# Patient Record
(demographics unavailable — no encounter records)

---

## 2025-04-22 NOTE — ASSESSMENT
[FreeTextEntry1] : Bimalleolar fracture left ankle  The history for today's visit was obtained from the child, as well as the parent. The child's history was unreliable alone due to age and therefore, the parent was used today as an independent historian.  3 views of the left ankle today in the cast reveal a medial malleolar fracture and Salter I fibula through scar of physis. No change from ER films. Acceptable position. Mild gap less than 2mm but no step off noted. Mortise intact. The xrays were discussed with father and patient at length. The fracture is acceptable in alignment at this time, but continued monitoring is needed to ensure no change in position. If this occurs, surgery may be indicated. He will continue NWB with the use of the crutches at this time. He will f/u in 1 week at the [facility] office for repeat xrays of the left ankle in the cast. No gym or sports. May attend school with accommodations. Moving forward, he may be able to f/u in the [place] office as they live in [place], but father works in [place]. All questions answered. Parent in agreement with the plan.  I, Alisha Navarro, MPAS, PAC, have acted as a scribe and documented the above for Dr. Renteria.   The above documentation completed by the PA is an accurate record of both my words and actions. Norm Renteria MD.  This note was generated using Dragon medical dictation software.  A reasonable effort has been made for proofreading its contents, but typos may still remain.  If there are any questions or points of clarification needed please do not hesitate to contact my office.

## 2025-04-22 NOTE — PHYSICAL EXAM
[FreeTextEntry1] : GAIT: ambulates with crutches, NWB on affected extremity with good coordination and balance. Shows competency with crutching. GENERAL: alert, cooperative pleasant young 12 yo male in NAD SKIN: The skin is intact, warm, pink and dry over the area examined. EYES: Normal conjunctiva, normal eyelids and pupils were equal and round. ENT: normal ears, normal nose and normal lips. CARDIOVASCULAR: brisk capillary refill, but no peripheral edema. RESPIRATORY: The patient is in no apparent respiratory distress. They're taking full deep breaths without use of accessory muscles or evidence of audible wheezes or stridor without the use of a stethoscope. Normal respiratory effort. ABDOMEN: not examined   LLE: Cast well fitting. SLC skin intact to areas exposed Toes mobile, 5/5 EHL/FHL sensation grossly intact brisk cap refill No pain with passive stretch of the toes

## 2025-04-22 NOTE — HISTORY OF PRESENT ILLNESS
[0] : currently ~his/her~ pain is 0 out of 10 [FreeTextEntry1] : 14 yo male presents with father for evaluation of left ankle fracture. He states it occurred last week while playing football. He went to [facility] and was found to have medial malleolar fracture and distal fibular fracture. He is currently in a SLC. He has been using crutches and admits to NWB on the LLE. No complaints of pain. No cast issues. No numbness or tingling.

## 2025-04-22 NOTE — REVIEW OF SYSTEMS
[Change in Activity] : change in activity [Limping] : limping [Joint Pains] : arthralgias [Joint Swelling] : joint swelling  [Appropriate Age Development] : development appropriate for age [Rash] : no rash [Heart Problems] : no heart problems [Congestion] : no congestion [Feeding Problem] : no feeding problem

## 2025-04-22 NOTE — HISTORY OF PRESENT ILLNESS
[0] : currently ~his/her~ pain is 0 out of 10 [FreeTextEntry1] : 14 yo male presents with father for evaluation of left ankle fracture. He states it occurred last week while playing football. He went to [facility] and was found to have medial malleolar fracture and distal fibular fracture. He is currently in a SLC. He has been using crutches and admits to NWB on the LLE. No complaints of pain. No cast issues. No numbness or tingling.

## 2025-04-22 NOTE — DATA REVIEWED
[de-identified] : 3 views of the left ankle today in the cast reveal a medial malleolar fracture and Salter I fibula through scar of physis. No change from ER films. Acceptable position. Mild gap less than 2mm but no step off noted. Mortise intact.

## 2025-04-22 NOTE — DATA REVIEWED
[de-identified] : 3 views of the left ankle today in the cast reveal a medial malleolar fracture and Salter I fibula through scar of physis. No change from ER films. Acceptable position. Mild gap less than 2mm but no step off noted. Mortise intact.

## 2025-04-22 NOTE — REASON FOR VISIT
[Initial Evaluation] : an initial evaluation [Mother] : mother [Patient] : patient [Father] : father [FreeTextEntry1] : Left ankle injury

## 2025-04-22 NOTE — ASSESSMENT
[FreeTextEntry1] : Bimalleolar fracture left ankle  The history for today's visit was obtained from the child, as well as the parent. The child's history was unreliable alone due to age and therefore, the parent was used today as an independent historian.  3 views of the left ankle today in the cast reveal a medial malleolar fracture and Salter I fibula through scar of physis. No change from ER films. Acceptable position. Mild gap less than 2mm but no step off noted. Mortise intact. The xrays were discussed with father and patient at length. The fracture is acceptable in alignment at this time, but continued monitoring is needed to ensure no change in position. If this occurs, surgery may be indicated. He will continue NWB with the use of the crutches at this time. He will f/u in 1 week at the [facility] office for repeat xrays of the left ankle in the cast. No gym or sports. May attend school with accommodations. Moving forward, he may be able to f/u in the [place] office as they live in [place], but father works in [place]. All questions answered. Parent in agreement with the plan.  I, Alisha Navarro, MPAS, PAC, have acted as a scribe and documented the above for Dr. Renteria.   The above documentation completed by the PA is an accurate record of both my words and actions. Norm Renteria MD.  This note was generated using Dragon medical dictation software.  A reasonable effort has been made for proofreading its contents, but typos may still remain.  If there are any questions or points of clarification needed please do not hesitate to contact my office.

## 2025-05-01 NOTE — HISTORY OF PRESENT ILLNESS
[0] : currently ~his/her~ pain is 0 out of 10 [FreeTextEntry1] : 13-year-old male presents with father for evaluation of left ankle fracture sustained on 04/15/2025. Per report it occurred while playing football. He went to [facility] and was found to have medial malleolar fracture and distal fibular fracture. He was placed in a SLC.   He was initially seen in our office on 04/21/2025 and recommended for continuation of SLC. Today father reports that he is tolerating the cast well. Denies any discomfort or pain. Denies any radiating pain or tingling sensation. He is not taking any pain medication. He has been non weight bearing on left lower extremity and using crutches for ambulation. Here for further orthopedic evaluation.

## 2025-05-01 NOTE — ASSESSMENT
[FreeTextEntry1] : 13-year-old male with left bimalleolar fracture sustained on 04/15/2025.  The history for today's visit was obtained from the child, as well as the parent. The child's history was unreliable alone due to age and therefore, the parent was used today as an independent historian. 3 views left ankle IN CAST radiographs were ordered, obtained, and independently reviewed in clinic on 04/30/2025 depicting medial malleolar fracture and Salter I fibula fracture with acceptable alignment compared to previous images. The fracture is acceptable in alignment at this time, but continued monitoring is needed to ensure no change in position. If this occurs, surgery may be indicated. He will continue NWB SLC at this time and crutches for ambulation.  No gym or sports. School note was provided. May attend school with accommodations.   We will plan to see him back in 3 weeks for cast removal and repeat X-Rays OOC and reevaluation.  All questions answered. Parent in agreement with the plan.  I, Sangeeta Rabago, have acted as a scribe and documented the above for Dr. Renteria.   The above documentation completed by the scribe is an accurate record of both my words and actions. Norm Renteria MD.

## 2025-05-01 NOTE — PHYSICAL EXAM
[FreeTextEntry1] : GAIT: ambulates with crutches, NWB on left lower extremity. GENERAL: alert, cooperative pleasant young 13 year old male in NAD SKIN: The skin is intact, warm, pink and dry over the area examined. EYES: Normal conjunctiva, normal eyelids and pupils were equal and round. ENT: normal ears, normal nose and normal lips. CARDIOVASCULAR: brisk capillary refill, but no peripheral edema. RESPIRATORY: The patient is in no apparent respiratory distress. They're taking full deep breaths without use of accessory muscles or evidence of audible wheezes or stridor without the use of a stethoscope. Normal respiratory effort. ABDOMEN: not examined    LLE:  Cast well fitting. SLC Skin intact to areas exposed Toes mobile, 5/5 EHL/FHL sensation grossly intact brisk cap refill No pain with passive stretch of the toes

## 2025-05-01 NOTE — REASON FOR VISIT
[Follow Up] : a follow up visit [Patient] : patient [Father] : father [FreeTextEntry1] : Left ankle injury sustained on 04/15/2025.

## 2025-05-01 NOTE — HISTORY OF PRESENT ILLNESS
[0] : currently ~his/her~ pain is 0 out of 10 [FreeTextEntry1] : 13-year-old male presents with father for evaluation of left ankle fracture sustained on 04/15/2025. Per report it occurred while playing football. He went to [facility] and was found to have medial malleolar fracture and distal fibular fracture. He was placed in a SLC.   He was initially seen in our office on 04/21/2025 and recommended for continuation of SLC. Today father reports that he is tolerating the cast well. Denies any discomfort or pain. Denies any radiating pain or tingling sensation. He is not taking any pain medication. He has been non weight bearing on left lower extremity and using crutches for ambulation. Here for further orthopedic evaluation.

## 2025-05-01 NOTE — DATA REVIEWED
[de-identified] : 3 views left ankle IN CAST radiographs were ordered, obtained, and independently reviewed in clinic on 04/30/2025 depicting medial malleolar fracture and Salter I fibula fracture with acceptable alignment compared to previous images.   3 views of the left ankle today in the cast reveal a medial malleolar fracture and Salter I fibula through scar of physis. No change from ER films. Acceptable position. Mild gap less than 2mm but no step off noted. Mortise intact.

## 2025-05-01 NOTE — DATA REVIEWED
[de-identified] : 3 views left ankle IN CAST radiographs were ordered, obtained, and independently reviewed in clinic on 04/30/2025 depicting medial malleolar fracture and Salter I fibula fracture with acceptable alignment compared to previous images.   3 views of the left ankle today in the cast reveal a medial malleolar fracture and Salter I fibula through scar of physis. No change from ER films. Acceptable position. Mild gap less than 2mm but no step off noted. Mortise intact.

## 2025-05-21 NOTE — ASSESSMENT
[FreeTextEntry1] : 13-year-old male with left bimalleolar fracture sustained on 04/15/2025.  The history for today's visit was obtained from the child, as well as the parent. The child's history was unreliable alone due to age, and therefore, the parent was used today as an independent historian.   Three views left ankle OUT OF CAST radiographs were ordered, obtained, and independently reviewed in clinic on 05/21/2025, depicting medial malleolar fracture and Salter I fibula fracture with acceptable alignment compared to previous images. Signs of progressive healing noted. Clinically, he has mild stiffness over the fracture site due to immobilization. Recommendation at this time, he will start weight bearing on the left lower extremity as tolerated. Advised to gradually wean from crutches. No gym or sports. School note was provided. May attend school with accommodations. We will plan to see him back in 3 weeks for cast removal and repeat X-Rays and reevaluation. All questions answered. Parent in agreement with the plan.  I, Sangeeta Rabago, have acted as a scribe and documented the above for Dr. Renteria.   The above documentation completed by the scribe is an accurate record of both my words and actions. Norm Renteria MD.

## 2025-05-21 NOTE — PHYSICAL EXAM
[FreeTextEntry1] : GAIT: ambulates with crutches, NWB on left lower extremity. GENERAL: alert, cooperative pleasant young 13-year-old male in NAD SKIN: The skin is intact, warm, pink and dry over the area examined. EYES: Normal conjunctiva, normal eyelids and pupils were equal and round. ENT: normal ears, normal nose and normal lips. CARDIOVASCULAR: brisk capillary refill, but no peripheral edema. RESPIRATORY: The patient is in no apparent respiratory distress. They're taking full deep breaths without use of accessory muscles or evidence of audible wheezes or stridor without the use of a stethoscope. Normal respiratory effort. ABDOMEN: not examined    LLE:  - Short leg cast was removed today. - No underlying skin irritation or breakdown. - No gross deformity. - No swelling and no tenderness over the fracture site. - Mild stiffness noted due to immobilization. - Limited ROM. - No swelling about the toes - Able to fully flex and extend all toes without discomfort - Toes are warm and appear well perfused with brisk capillary refill - Sensation is grossly intact

## 2025-05-21 NOTE — HISTORY OF PRESENT ILLNESS
[FreeTextEntry1] : A 13-year-old male presents with his father for evaluation of a left ankle fracture sustained on 04/15/2025. Per report, it occurred while playing football. He went to [facility] and was found to have a medial malleolar fracture and distal fibular fracture. He was placed in a SLC. He was initially seen in our office on 04/21/2025 and recommended for continuation of SLC.   Today father reports that he is tolerating the cast well. Denies any discomfort or pain. Denies any radiating pain or tingling sensation. He is not taking any pain medication. He has been non weight bearing on left lower extremity and using crutches for ambulation. Here for further orthopedic evaluation.

## 2025-05-21 NOTE — REVIEW OF SYSTEMS
[Limping] : limping [Joint Pains] : arthralgias [Joint Swelling] : joint swelling  [Appropriate Age Development] : development appropriate for age [Change in Activity] : no change in activity [Rash] : no rash [Heart Problems] : no heart problems [Congestion] : no congestion [Feeding Problem] : no feeding problem

## 2025-05-21 NOTE — DATA REVIEWED
[de-identified] : 3 views left ankle OUT OF CAST radiographs were ordered, obtained, and independently reviewed in clinic on 05/21/2025, depicting medial malleolar fracture and Salter I fibula fracture with acceptable alignment compared to previous images. Signs of progressive healing noted.  3 views of the left ankle today in the cast reveal a medial malleolar fracture and a Salter I fibula through scar of physis. No change from ER films. Acceptable position. Mild gap less than 2mm, but no step off noted. Mortise intact.

## 2025-06-13 NOTE — PHYSICAL EXAM
[FreeTextEntry1] : GAIT: ambulates without any limp  GENERAL: alert, cooperative pleasant young 13-year-old male in NAD SKIN: The skin is intact, warm, pink and dry over the area examined. EYES: Normal conjunctiva, normal eyelids and pupils were equal and round. ENT: normal ears, normal nose and normal lips. CARDIOVASCULAR: brisk capillary refill, but no peripheral edema. RESPIRATORY: The patient is in no apparent respiratory distress. They're taking full deep breaths without use of accessory muscles or evidence of audible wheezes or stridor without the use of a stethoscope. Normal respiratory effort. ABDOMEN: not examined    LLE:  - No underlying skin irritation or breakdown. - No gross deformity. - No swelling and no tenderness over the fracture site. - Limited ROM due to stiffness  - No swelling about the toes - Able to fully flex and extend all toes without discomfort - Toes are warm and appear well perfused with brisk capillary refill - Sensation is grossly intact

## 2025-06-13 NOTE — HISTORY OF PRESENT ILLNESS
[FreeTextEntry1] : A 13-year-old male presents with his father for follow up of a left ankle fracture sustained on 04/15/2025. Per report, it occurred while playing football. He went to [facility] and was found to have a medial malleolar fracture and distal fibular fracture. He was placed in a SLC. He was initially seen in our office on 04/21/2025 and recommended for continuation of SLC. Last seen by my partner Dr. Renteria on 5/21/25. His SLC was removed.   Today father reports that he is doing well. Denies any current ankle pain. Denies any discomfort or pain. Denies any radiating pain or tingling sensation. He is not taking any pain medication. Dad is requesting physical therapy. Here for further orthopedic evaluation and management.

## 2025-06-13 NOTE — HISTORY OF PRESENT ILLNESS
[FreeTextEntry1] : A 13-year-old male presents with his father for follow up of a left ankle fracture sustained on 04/15/2025. Per report, it occurred while playing football. He went to [facility] and was found to have a medial malleolar fracture and distal fibular fracture. He was placed in a SLC. He was initially seen in our office on 04/21/2025 and recommended for continuation of SLC. Last seen by my partner Dr. Renteria on 5/21/25. His SLC was removed.   Today father reports that he is doing well. Denies any current ankle pain. Denies any discomfort or pain. Denies any radiating pain or tingling sensation. He is not taking any pain medication. Dad is requesting physical therapy. Here for further orthopedic evaluation and management.

## 2025-06-13 NOTE — REVIEW OF SYSTEMS
[Change in Activity] : no change in activity [Rash] : no rash [Heart Problems] : no heart problems [Congestion] : no congestion [Feeding Problem] : no feeding problem [Limping] : no limping [Joint Pains] : no arthralgias [Joint Swelling] : no joint swelling [Appropriate Age Development] : development appropriate for age

## 2025-06-13 NOTE — DATA REVIEWED
[de-identified] : 3 views left ankle radiographs were ordered, obtained, and independently reviewed in clinic on 06/13/25 depicting medial malleolar fracture and Salter I fibula fracture with acceptable alignment compared to previous images. Signs of progressive healing noted.

## 2025-06-13 NOTE — REASON FOR VISIT
[Follow Up] : a follow up visit [FreeTextEntry1] : Left ankle injury sustained on 04/15/2025. [Patient] : patient [Father] : father

## 2025-06-13 NOTE — ASSESSMENT
[FreeTextEntry1] : 13-year-old male with left bimalleolar fracture sustained on 04/15/2025.  The history for today's visit was obtained from the child, as well as the parent. The child's history was unreliable alone due to age, and therefore, the parent was used today as an independent historian.   Clinical findings and imaging discussed at length with father and patient. Three views left ankle radiographs were ordered, obtained, and independently reviewed in clinic today, depicting medial malleolar fracture and Salter I fibula fracture with acceptable alignment compared to previous images. Signs of progressive healing noted. Clinically, he has mild stiffness over the fracture site due to immobilization. Recommendation at this time would be physical therapy to work on ankle strengthening and range of motion. Avoid gym, sports and recess. He will f/u in 4-6 weeks for repeat clinical evaluation and XR left ankle. All questions answered. Family and patient verbalize understanding of the plan.   Sue ORANTES PA-C have acted as scribe and documented the above for Dr. Hanks

## 2025-06-13 NOTE — DATA REVIEWED
[de-identified] : 3 views left ankle radiographs were ordered, obtained, and independently reviewed in clinic on 06/13/25 depicting medial malleolar fracture and Salter I fibula fracture with acceptable alignment compared to previous images. Signs of progressive healing noted.